# Patient Record
Sex: FEMALE | Race: WHITE | URBAN - METROPOLITAN AREA
[De-identification: names, ages, dates, MRNs, and addresses within clinical notes are randomized per-mention and may not be internally consistent; named-entity substitution may affect disease eponyms.]

---

## 2018-05-02 ENCOUNTER — HOSPITAL ENCOUNTER (EMERGENCY)
Facility: CLINIC | Age: 33
Discharge: HOME | End: 2018-05-02
Attending: PHYSICIAN ASSISTANT | Admitting: PHYSICIAN ASSISTANT

## 2018-05-02 ENCOUNTER — APPOINTMENT (OUTPATIENT)
Dept: MRI IMAGING | Facility: CLINIC | Age: 33
End: 2018-05-02
Attending: PHYSICIAN ASSISTANT

## 2018-05-02 VITALS
DIASTOLIC BLOOD PRESSURE: 74 MMHG | WEIGHT: 145 LBS | TEMPERATURE: 98.6 F | HEART RATE: 93 BPM | RESPIRATION RATE: 20 BRPM | OXYGEN SATURATION: 94 % | SYSTOLIC BLOOD PRESSURE: 124 MMHG

## 2018-05-02 DIAGNOSIS — R20.2 PARESTHESIA: ICD-10-CM

## 2018-05-02 LAB
ALBUMIN SERPL-MCNC: 4.2 G/DL (ref 3.4–5)
ALP SERPL-CCNC: 42 U/L (ref 40–150)
ALT SERPL W P-5'-P-CCNC: 57 U/L (ref 0–50)
ANION GAP SERPL CALCULATED.3IONS-SCNC: 6 MMOL/L (ref 3–14)
AST SERPL W P-5'-P-CCNC: 45 U/L (ref 0–45)
B-HCG FREE SERPL-ACNC: <5 IU/L
BASOPHILS # BLD AUTO: 0.1 10E9/L (ref 0–0.2)
BASOPHILS NFR BLD AUTO: 1 %
BILIRUB DIRECT SERPL-MCNC: 0.1 MG/DL (ref 0–0.2)
BILIRUB SERPL-MCNC: 1.4 MG/DL (ref 0.2–1.3)
BUN SERPL-MCNC: 6 MG/DL (ref 7–30)
CALCIUM SERPL-MCNC: 9.1 MG/DL (ref 8.5–10.1)
CHLORIDE SERPL-SCNC: 106 MMOL/L (ref 94–109)
CO2 SERPL-SCNC: 27 MMOL/L (ref 20–32)
CREAT SERPL-MCNC: 0.64 MG/DL (ref 0.52–1.04)
DIFFERENTIAL METHOD BLD: NORMAL
EOSINOPHIL # BLD AUTO: 0.2 10E9/L (ref 0–0.7)
EOSINOPHIL NFR BLD AUTO: 2.8 %
ERYTHROCYTE [DISTWIDTH] IN BLOOD BY AUTOMATED COUNT: 14 % (ref 10–15)
ETHANOL SERPL-MCNC: <0.01 G/DL
GFR SERPL CREATININE-BSD FRML MDRD: >90 ML/MIN/1.7M2
GLUCOSE BLDC GLUCOMTR-MCNC: 143 MG/DL (ref 70–99)
GLUCOSE SERPL-MCNC: 134 MG/DL (ref 70–99)
HCT VFR BLD AUTO: 37.9 % (ref 35–47)
HGB BLD-MCNC: 12.5 G/DL (ref 11.7–15.7)
IMM GRANULOCYTES # BLD: 0 10E9/L (ref 0–0.4)
IMM GRANULOCYTES NFR BLD: 0.3 %
INTERPRETATION ECG - MUSE: NORMAL
LYMPHOCYTES # BLD AUTO: 1.5 10E9/L (ref 0.8–5.3)
LYMPHOCYTES NFR BLD AUTO: 21.7 %
MAGNESIUM SERPL-MCNC: 2.4 MG/DL (ref 1.6–2.3)
MCH RBC QN AUTO: 29.8 PG (ref 26.5–33)
MCHC RBC AUTO-ENTMCNC: 33 G/DL (ref 31.5–36.5)
MCV RBC AUTO: 91 FL (ref 78–100)
MONOCYTES # BLD AUTO: 0.5 10E9/L (ref 0–1.3)
MONOCYTES NFR BLD AUTO: 7.8 %
NEUTROPHILS # BLD AUTO: 4.6 10E9/L (ref 1.6–8.3)
NEUTROPHILS NFR BLD AUTO: 66.4 %
NRBC # BLD AUTO: 0 10*3/UL
NRBC BLD AUTO-RTO: 0 /100
PLATELET # BLD AUTO: 326 10E9/L (ref 150–450)
POTASSIUM SERPL-SCNC: 3.8 MMOL/L (ref 3.4–5.3)
PROT SERPL-MCNC: 8.6 G/DL (ref 6.8–8.8)
RBC # BLD AUTO: 4.19 10E12/L (ref 3.8–5.2)
SODIUM SERPL-SCNC: 139 MMOL/L (ref 133–144)
WBC # BLD AUTO: 6.9 10E9/L (ref 4–11)

## 2018-05-02 PROCEDURE — A9585 GADOBUTROL INJECTION: HCPCS | Performed by: RADIOLOGY

## 2018-05-02 PROCEDURE — 96361 HYDRATE IV INFUSION ADD-ON: CPT

## 2018-05-02 PROCEDURE — 96375 TX/PRO/DX INJ NEW DRUG ADDON: CPT

## 2018-05-02 PROCEDURE — 25000128 H RX IP 250 OP 636: Performed by: PHYSICIAN ASSISTANT

## 2018-05-02 PROCEDURE — 80076 HEPATIC FUNCTION PANEL: CPT | Performed by: PHYSICIAN ASSISTANT

## 2018-05-02 PROCEDURE — 85025 COMPLETE CBC W/AUTO DIFF WBC: CPT | Performed by: PHYSICIAN ASSISTANT

## 2018-05-02 PROCEDURE — 80320 DRUG SCREEN QUANTALCOHOLS: CPT | Performed by: PHYSICIAN ASSISTANT

## 2018-05-02 PROCEDURE — 83735 ASSAY OF MAGNESIUM: CPT | Performed by: PHYSICIAN ASSISTANT

## 2018-05-02 PROCEDURE — 84702 CHORIONIC GONADOTROPIN TEST: CPT

## 2018-05-02 PROCEDURE — 93005 ELECTROCARDIOGRAM TRACING: CPT

## 2018-05-02 PROCEDURE — 99285 EMERGENCY DEPT VISIT HI MDM: CPT | Mod: 25

## 2018-05-02 PROCEDURE — 00000146 ZZHCL STATISTIC GLUCOSE BY METER IP

## 2018-05-02 PROCEDURE — 96374 THER/PROPH/DIAG INJ IV PUSH: CPT | Mod: 59

## 2018-05-02 PROCEDURE — 80048 BASIC METABOLIC PNL TOTAL CA: CPT | Performed by: PHYSICIAN ASSISTANT

## 2018-05-02 PROCEDURE — 70553 MRI BRAIN STEM W/O & W/DYE: CPT

## 2018-05-02 PROCEDURE — 25000128 H RX IP 250 OP 636: Performed by: RADIOLOGY

## 2018-05-02 RX ORDER — DEXAMETHASONE SODIUM PHOSPHATE 10 MG/ML
10 INJECTION, SOLUTION INTRAMUSCULAR; INTRAVENOUS EVERY 6 HOURS
Status: DISCONTINUED | OUTPATIENT
Start: 2018-05-02 | End: 2018-05-02 | Stop reason: HOSPADM

## 2018-05-02 RX ORDER — DIPHENHYDRAMINE HYDROCHLORIDE 50 MG/ML
25 INJECTION INTRAMUSCULAR; INTRAVENOUS ONCE
Status: COMPLETED | OUTPATIENT
Start: 2018-05-02 | End: 2018-05-02

## 2018-05-02 RX ORDER — GADOBUTROL 604.72 MG/ML
7.5 INJECTION INTRAVENOUS ONCE
Status: COMPLETED | OUTPATIENT
Start: 2018-05-02 | End: 2018-05-02

## 2018-05-02 RX ORDER — ONDANSETRON 2 MG/ML
4 INJECTION INTRAMUSCULAR; INTRAVENOUS EVERY 30 MIN PRN
Status: DISCONTINUED | OUTPATIENT
Start: 2018-05-02 | End: 2018-05-02 | Stop reason: HOSPADM

## 2018-05-02 RX ORDER — KETOROLAC TROMETHAMINE 30 MG/ML
15 INJECTION, SOLUTION INTRAMUSCULAR; INTRAVENOUS ONCE
Status: COMPLETED | OUTPATIENT
Start: 2018-05-02 | End: 2018-05-02

## 2018-05-02 RX ORDER — METOCLOPRAMIDE HYDROCHLORIDE 5 MG/ML
10 INJECTION INTRAMUSCULAR; INTRAVENOUS ONCE
Status: COMPLETED | OUTPATIENT
Start: 2018-05-02 | End: 2018-05-02

## 2018-05-02 RX ADMIN — ONDANSETRON 4 MG: 2 INJECTION INTRAMUSCULAR; INTRAVENOUS at 12:52

## 2018-05-02 RX ADMIN — KETOROLAC TROMETHAMINE 15 MG: 30 INJECTION, SOLUTION INTRAMUSCULAR at 13:12

## 2018-05-02 RX ADMIN — METOCLOPRAMIDE 10 MG: 5 INJECTION, SOLUTION INTRAMUSCULAR; INTRAVENOUS at 13:12

## 2018-05-02 RX ADMIN — SODIUM CHLORIDE 500 ML: 9 INJECTION, SOLUTION INTRAVENOUS at 12:32

## 2018-05-02 RX ADMIN — GADOBUTROL 6 ML: 604.72 INJECTION INTRAVENOUS at 13:40

## 2018-05-02 RX ADMIN — DEXAMETHASONE SODIUM PHOSPHATE 10 MG: 10 INJECTION, SOLUTION INTRAMUSCULAR; INTRAVENOUS at 13:12

## 2018-05-02 RX ADMIN — SODIUM CHLORIDE 1000 ML: 9 INJECTION, SOLUTION INTRAVENOUS at 13:13

## 2018-05-02 RX ADMIN — DIPHENHYDRAMINE HYDROCHLORIDE 25 MG: 50 INJECTION, SOLUTION INTRAMUSCULAR; INTRAVENOUS at 13:11

## 2018-05-02 ASSESSMENT — ENCOUNTER SYMPTOMS
SPEECH DIFFICULTY: 0
WEAKNESS: 0
TREMORS: 1
NAUSEA: 1
NUMBNESS: 1
ABDOMINAL PAIN: 0
FACIAL ASYMMETRY: 0

## 2018-05-02 NOTE — ED AVS SNAPSHOT
" [facility] Emergency Department    [address]    [facility] [address]    Phone:  [phone]    Fax:  [phone]                                       Ramonita Gordon   MRN: [id number]    Department:  [facility] Emergency Department   Date of Visit:  5/2/2018           Patient Information     Date Of Birth          1985        Your diagnoses for this visit were:     Paresthesia        You were seen by Sabine Person PA-C.      Follow-up Information     Follow up with Neurology, [facility] In 2 days.    Why:  recheck    Contact information:    [address]  [address]  [facility] [address]  [phone]          Follow up with [facility] Emergency Department.    Specialty:  EMERGENCY MEDICINE    Why:  If symptoms worsen    Contact information:    [address]  [facility] [address]  [phone]        Discharge Instructions         Paraesthesias  Paraesthesia is a burning or prickling sensation that is sometimes felt in the hands, arms, legs or feet. It can also occur in other parts of the body. It can also feel like tingling or numbness, skin crawling, or itching. The feeling is not comfortable, but it is not painful. (The \"pins and needles\" feeling that happens when a foot or hand \"falls asleep\" is a temporary paraesthesia.)  Paraesthesias that last or come and go may be caused by medical issues that need to be treated. These include stroke, a bulging disk pressing on a nerve, a trapped nerve, vitamin deficiencies, or even certain medicines.  Tests are often done. These tests may include blood tests, X-ray, CT (computerized tomography) scan, or a muscle test (electromyography). Depending on the cause, treatment may include physical therapy.  Home care    Tell the healthcare provider about all medicines you take. This includes prescription and over-the-counter medicines, vitamins, and herbs. Ask if any of the " medicines may be causing your problems. Do not make any changes to prescription medicines without talking to your healthcare provider first.    You may be prescribed medicines to help relieve the tingling feeling or for pain. Take all medicines as directed.    A numb hand or foot may be more prone to injury. To help protect it:  ? Always use oven mitts.  ? Test water with an unaffected hand or foot.  ? Use caution when trimming nails. File sharp areas.  ? Wear shoes that fit well to avoid pressure points, blisters, and ulcers.  ? Inspect your hands and feet carefully (including the soles of your feet and between your toes) at least once a week. If you see red areas, sores, or other problems, tell your healthcare provider.  Follow-up care  Follow up with your doctor or as advised by our staff. You may need further testing or evaluation.  When to seek medical advice  Call your healthcare provider right away if any of the following occur:    Numbness or weakness of the face, one arm, or one leg    Slurred speech, confusion, trouble speaking, walking, or seeing    Severe headache, fainting spell, dizziness, or seizure    Chest, arm, neck, or upper back pain    Loss of bladder or bowel control    Open wound with redness, swelling, or pus  Date Last Reviewed: 9/25/2015 2000-2017 [employer]. [address] [place] [address]. All rights reserved. This information is not intended as a substitute for professional medical care. Always follow your healthcare professional's instructions.          24 Hour Appointment Hotline       To make an appointment at any [facility], call [phone] ([phone]). If you don't have a family doctor or clinic, we will help you find one. [facility] are conveniently located to serve the needs of you and your family.             Review of your medicines      Notice     You have not been prescribed any medications.            Procedures and tests  performed during your visit     Alcohol ethyl    Basic metabolic panel    CBC with platelets differential    EKG 12-lead, tracing only    Glucose by meter    Glucose monitor nursing POCT    Hepatic panel    ISTAT HCG Quantitative Pregnancy POCT    MR Brain w/o & w Contrast    Magnesium    Pulse oximetry nursing      Orders Needing Specimen Collection     None      Pending Results     No orders found from 4/30/2018 to 5/3/2018.            Pending Culture Results     No orders found from 4/30/2018 to 5/3/2018.            Pending Results Instructions     If you had any lab results that were not finalized at the time of your Discharge, you can call the ED Lab Result RN at [phone]. You will be contacted by this team for any positive Lab results or changes in treatment. The nurses are available 7 days a week from 10A to 6:30P.  You can leave a message 24 hours per day and they will return your call.        Test Results From Your Hospital Stay        5/2/2018 12:51 PM      Component Results     Component Value Ref Range & Units Status    WBC 6.9 4.0 - 11.0 10e9/L Final    RBC Count 4.19 3.8 - 5.2 10e12/L Final    Hemoglobin 12.5 11.7 - 15.7 g/dL Final    Hematocrit 37.9 35.0 - 47.0 % Final    MCV 91 78 - 100 fl Final    MCH 29.8 26.5 - 33.0 pg Final    MCHC 33.0 31.5 - 36.5 g/dL Final    RDW 14.0 10.0 - 15.0 % Final    Platelet Count 326 150 - 450 10e9/L Final    Diff Method Automated Method  Final    % Neutrophils 66.4 % Final    % Lymphocytes 21.7 % Final    % Monocytes 7.8 % Final    % Eosinophils 2.8 % Final    % Basophils 1.0 % Final    % Immature Granulocytes 0.3 % Final    Nucleated RBCs 0 0 /100 Final    Absolute Neutrophil 4.6 1.6 - 8.3 10e9/L Final    Absolute Lymphocytes 1.5 0.8 - 5.3 10e9/L Final    Absolute Monocytes 0.5 0.0 - 1.3 10e9/L Final    Absolute Eosinophils 0.2 0.0 - 0.7 10e9/L Final    Absolute Basophils 0.1 0.0 - 0.2 10e9/L Final    Abs Immature Granulocytes 0.0 0 - 0.4 10e9/L Final     Absolute Nucleated RBC 0.0  Final         5/2/2018  1:05 PM      Component Results     Component Value Ref Range & Units Status    Sodium 139 133 - 144 mmol/L Final    Potassium 3.8 3.4 - 5.3 mmol/L Final    Chloride 106 94 - 109 mmol/L Final    Carbon Dioxide 27 20 - 32 mmol/L Final    Anion Gap 6 3 - 14 mmol/L Final    Glucose 134 (H) 70 - 99 mg/dL Final    Urea Nitrogen 6 (L) 7 - 30 mg/dL Final    Creatinine 0.64 0.52 - 1.04 mg/dL Final    GFR Estimate >90 >60 mL/min/1.7m2 Final    Non  GFR Calc    GFR Estimate If Black >90 >60 mL/min/1.7m2 Final    African American GFR Calc    Calcium 9.1 8.5 - 10.1 mg/dL Final         5/2/2018 12:40 PM      Component Results     Component Value Ref Range & Units Status    HCG Quantitative Serum <5.0 <5.0 IU/L Final         5/2/2018 12:59 PM      Component Results     Component Value Ref Range & Units Status    Glucose 143 (H) 70 - 99 mg/dL Final         5/2/2018  4:22 PM      Narrative     MRI OF THE BRAIN WITHOUT AND WITH CONTRAST 5/2/2018 1:49 PM     COMPARISON: None.    HISTORY: Left-sided facial and lateral arm and leg numbness.      TECHNIQUE: Multi-sequence, multi-planar MRI images of the brain were  acquired before and after the administration of IV gadolinium (6 mL  Gadavist).    FINDINGS: The ventricles and basal cisterns are normal in  configuration. There is no midline shift. There are no extra-axial  fluid collections. Gray-white differentiation is well maintained.  There is no evidence for stroke or acute intracranial hemorrhage.  There is no abnormal contrast enhancement in the brain or its  coverings.    There is no sinusitis or mastoiditis.        Impression     IMPRESSION: Normal brain MRI.    BENJIE MELENDEZ MD         5/2/2018  3:02 PM      Component Results     Component Value Ref Range & Units Status    Bilirubin Direct 0.1 0.0 - 0.2 mg/dL Final    Bilirubin Total 1.4 (H) 0.2 - 1.3 mg/dL Final    Albumin 4.2 3.4 - 5.0 g/dL Final    Protein  Total 8.6 6.8 - 8.8 g/dL Final    Alkaline Phosphatase 42 40 - 150 U/L Final    ALT 57 (H) 0 - 50 U/L Final    AST 45 0 - 45 U/L Final         5/2/2018  3:02 PM      Component Results     Component Value Ref Range & Units Status    Ethanol g/dL <0.01 <0.01 g/dL Final         5/2/2018  2:33 PM      Component Results     Component Value Ref Range & Units Status    Magnesium 2.4 (H) 1.6 - 2.3 mg/dL Final                Clinical Quality Measure: Blood Pressure Screening     Your blood pressure was checked while you were in the emergency department today. The last reading we obtained was  BP: (!) 130/94 . Please read the guidelines below about what these numbers mean and what you should do about them.  If your systolic blood pressure (the top number) is less than 120 and your diastolic blood pressure (the bottom number) is less than 80, then your blood pressure is normal. There is nothing more that you need to do about it.  If your systolic blood pressure (the top number) is 120-139 or your diastolic blood pressure (the bottom number) is 80-89, your blood pressure may be higher than it should be. You should have your blood pressure rechecked within a year by a primary care provider.  If your systolic blood pressure (the top number) is 140 or greater or your diastolic blood pressure (the bottom number) is 90 or greater, you may have high blood pressure. High blood pressure is treatable, but if left untreated over time it can put you at risk for heart attack, stroke, or kidney failure. You should have your blood pressure rechecked by a primary care provider within the next 4 weeks.  If your provider in the emergency department today gave you specific instructions to follow-up with your doctor or provider even sooner than that, you should follow that instruction and not wait for up to 4 weeks for your follow-up visit.        Thank you for choosing [facility]       Thank you for choosing [facility] for your care. Our goal is  "always to provide you with excellent care. Hearing back from our patients is one way we can continue to improve our services. Please take a few minutes to complete the written survey that you may receive in the mail after you visit with us. Thank you!        [employer] Information     [employer] lets you send messages to your doctor, view your test results, renew your prescriptions, schedule appointments and more. To sign up, go to [email or web address] . Click on \"Log in\" on the left side of the screen, which will take you to the Welcome page. Then click on \"Sign up Now\" on the right side of the page.     You will be asked to enter the access code listed below, as well as some personal information. Please follow the directions to create your username and password.     Your access code is: [email or web address]  Expires: 2018  4:36 PM     Your access code will  in 90 days. If you need help or a new code, please call your [place] clinic or [phone].        Care EveryWhere ID     This is your Care EveryWhere ID. This could be used by other organizations to access your [place] medical records  [id number]        Equal Access to Services     CHRIS DE LEON : Hadii je Montoya, wajanet espinosa, qaisaías palmer, suri addison. So [facility] [phone].    ATENCIÓN: Si habla español, tiene a love disposición servicios gratuitos de asistencia lingüística. Jean Paul al [phone].    We comply with applicable federal civil rights laws and [place] laws. We do not discriminate on the basis of race, color, national origin, age, disability, sex, sexual orientation, or gender identity.            After Visit Summary       This is your record. Keep this with you and show to your community pharmacist(s) and doctor(s) at your next visit.                  "

## 2018-05-02 NOTE — ED TRIAGE NOTES
90 minutes of progressing numbness on left side of body from top of head down to left knee.  Denies weakness.  Negative droop.  Speech clear.      ABCs intact.  Alert and orientedx 3.

## 2018-05-02 NOTE — ED PROVIDER NOTES
Emergency Department Attending Supervision Note  5/2/2018  12:59 PM      I evaluated this patient in conjunction with Sabine AZEVEDO      Briefly, the patient presented with gradual onset of left sided numbness that started approximately 1030.  This started on the left side of her face is moved to her left upper arm and left upper thigh.  She has no other deficits.  There is no weakness.  She has had some nausea no vomiting.  Due to the continuation of the symptoms she presented for evaluation.  She does have a distant history of migraines but has not had one in 15 years.  She describes this as different from when she had migraines back then.  She did not have any associated neurologic symptoms.  She does seem to be mildly anxious and tremulous.  She has had no recent fevers, vomiting, diarrhea, abdominal pain, shortness of breath, or chest pain.    After twin sisters arrival.  The nurse was pulled aside and told that the patient drinks up to 15 drinks a day.      On my exam:  General:  Alert, No obvious discomfort, well kept  HEENT:  Normal Voice, PERRL, EOM normal, oropharynx is normal, Uvula is midline, Conjunctivae and sclerae are normal, No lymphadenopathy    Neck: Normal range of motion, No meningismus. There is no midline cervical spine pain/tenderness. No mass is detected  CV:  Regular rate and underlying rhythm, Normal Peripheral pulses. No murmurs, rubs or clicks  Resp: Lungs are clear, No tachypnea, Non-labored breathing, No wheezing, crackles, or rales   GI:  Abdomen is soft, there is no rigidity, No distension, No tympani, No rebound tenderness, Non-surgical without peritoneal features    MS:  No major joint effusions, No asymmetric leg swelling. No calf tenderness  Skin:  No rash or acute skin lesions noted, Warm, Dry  Neuro:    National Institutes of Health Stroke Scale  Exam Interval: Baseline   Score    Level of consciousness: (0)   Alert, keenly responsive    LOC questions: (0)   Answers both  questions correctly    LOC commands: (0)   Performs both tasks correctly    Best gaze: (0)   Normal    Visual: (0)   No visual loss    Facial palsy: (0)   Normal symmetrical movements    Motor arm (left): (0)   No drift    Motor arm (right): (0)   No drift    Motor leg (left): (0)   No drift    Motor leg (right): (0)   No drift    Limb ataxia: (0)   Absent    Sensory: (1)   Mild to moderate sensory loss (ability to distinguish sharp dull sensation left side of face and left upper arm)    Best language: (0)   Normal- no aphasia    Dysarthria: (0)   Normal    Extinction and inattention: (0)   No abnormality        Total Score:  1   Psych:  Awake. Alert.  Mildly anxious.  Appropriate interactions. Good eye contact    Recent Results (from the past 24 hour(s))   MR Brain w/o & w Contrast    Narrative    MRI OF THE BRAIN WITHOUT AND WITH CONTRAST 5/2/2018 1:49 PM     COMPARISON: None.    HISTORY: Left-sided facial and lateral arm and leg numbness.      TECHNIQUE: Multi-sequence, multi-planar MRI images of the brain were  acquired before and after the administration of IV gadolinium (6 mL  Gadavist).    FINDINGS: The ventricles and basal cisterns are normal in  configuration. There is no midline shift. There are no extra-axial  fluid collections. Gray-white differentiation is well maintained.  There is no evidence for stroke or acute intracranial hemorrhage.  There is no abnormal contrast enhancement in the brain or its  coverings.    There is no sinusitis or mastoiditis.      Impression    IMPRESSION: Normal brain MRI.     Recent Results (from the past 8 hour(s))   CBC with platelets differential    Collection Time: 05/02/18 12:21 PM   Result Value Ref Range    WBC 6.9 4.0 - 11.0 10e9/L    RBC Count 4.19 3.8 - 5.2 10e12/L    Hemoglobin 12.5 11.7 - 15.7 g/dL    Hematocrit 37.9 35.0 - 47.0 %    MCV 91 78 - 100 fl    MCH 29.8 26.5 - 33.0 pg    MCHC 33.0 31.5 - 36.5 g/dL    RDW 14.0 10.0 - 15.0 %    Platelet Count 326 150 -  450 10e9/L    Diff Method Automated Method     % Neutrophils 66.4 %    % Lymphocytes 21.7 %    % Monocytes 7.8 %    % Eosinophils 2.8 %    % Basophils 1.0 %    % Immature Granulocytes 0.3 %    Nucleated RBCs 0 0 /100    Absolute Neutrophil 4.6 1.6 - 8.3 10e9/L    Absolute Lymphocytes 1.5 0.8 - 5.3 10e9/L    Absolute Monocytes 0.5 0.0 - 1.3 10e9/L    Absolute Eosinophils 0.2 0.0 - 0.7 10e9/L    Absolute Basophils 0.1 0.0 - 0.2 10e9/L    Abs Immature Granulocytes 0.0 0 - 0.4 10e9/L    Absolute Nucleated RBC 0.0    Basic metabolic panel    Collection Time: 05/02/18 12:21 PM   Result Value Ref Range    Sodium 139 133 - 144 mmol/L    Potassium 3.8 3.4 - 5.3 mmol/L    Chloride 106 94 - 109 mmol/L    Carbon Dioxide 27 20 - 32 mmol/L    Anion Gap 6 3 - 14 mmol/L    Glucose 134 (H) 70 - 99 mg/dL    Urea Nitrogen 6 (L) 7 - 30 mg/dL    Creatinine 0.64 0.52 - 1.04 mg/dL    GFR Estimate >90 >60 mL/min/1.7m2    GFR Estimate If Black >90 >60 mL/min/1.7m2    Calcium 9.1 8.5 - 10.1 mg/dL   Hepatic panel    Collection Time: 05/02/18 12:21 PM   Result Value Ref Range    Bilirubin Direct 0.1 0.0 - 0.2 mg/dL    Bilirubin Total 1.4 (H) 0.2 - 1.3 mg/dL    Albumin 4.2 3.4 - 5.0 g/dL    Protein Total 8.6 6.8 - 8.8 g/dL    Alkaline Phosphatase 42 40 - 150 U/L    ALT 57 (H) 0 - 50 U/L    AST 45 0 - 45 U/L   Alcohol ethyl    Collection Time: 05/02/18 12:21 PM   Result Value Ref Range    Ethanol g/dL <0.01 <0.01 g/dL   Magnesium    Collection Time: 05/02/18 12:21 PM   Result Value Ref Range    Magnesium 2.4 (H) 1.6 - 2.3 mg/dL   EKG 12-lead, tracing only    Collection Time: 05/02/18 12:24 PM   Result Value Ref Range    Interpretation ECG Click View Image link to view waveform and result    ISTAT HCG Quantitative Pregnancy POCT    Collection Time: 05/02/18 12:26 PM   Result Value Ref Range    HCG Quantitative Serum <5.0 <5.0 IU/L   Glucose by meter    Collection Time: 05/02/18 12:42 PM   Result Value Ref Range    Glucose 143 (H) 70 - 99 mg/dL      Patient with low NIH scale and negative MRI makes CVA unlikely.  Her laboratory studies show are noncontributory.  She does have an elevated glucose today.  Due to patient's sisters comment about drinking I did check an alcohol level this was negative.  I doubt withdrawal symptoms.  There is been no sign of seizure activity.  Given the lack of findings as above, resolution of symptoms from the above treatment, and patient's history of migraines.  This most likely represents a migraine variant.    Diagnosis    ICD-10-CM    1. Paresthesia R20.2          Remigio Yoo 5/2/2018 12:59 PM     Remigio Ruiz, APRN CNP  05/02/18 1622

## 2018-05-02 NOTE — ED AVS SNAPSHOT
[facility] Emergency Department    [address]    [facility] [address]    Phone:  [phone]    Fax:  [phone]                                       Ramonita Gordon   MRN: [id number]    Department:  [facility] Emergency Department   Date of Visit:  5/2/2018           After Visit Summary Signature Page     I have received my discharge instructions, and my questions have been answered. I have discussed any challenges I see with this plan with the nurse or doctor.    ..........................................................................................................................................  Patient/Patient Representative Signature      ..........................................................................................................................................  Patient Representative Print Name and Relationship to Patient    ..................................................               ................................................  Date                                            Time    ..........................................................................................................................................  Reviewed by Signature/Title    ...................................................              ..............................................  Date                                                            Time

## 2018-05-02 NOTE — ED PROVIDER NOTES
History     Chief Complaint:  Numbness    HPI   Ramonita Gordon is a 32 year old female with a history of migraines who presents to the emergency department today for evaluation of numbness. The patient reports an onset of left sided facial numbness starting at 1030.  Since that time, this numbness has progressed on her lateral left arm and her lateral left leg.  This is associated with nausea, but she denies vomiting. She was concerned about this numbness prompting her visit to the emergency department. At arrival, the patient also notes tremors on both sides. No previous similar episodes. No associated pain. She denies facial droop, speech difficulty, abdominal pain, fever, recent illness, recent travel, chest pain, shortness of breath, weakness, and birth control use. Of note, the patient is currently on her period.     Allergies:  Codeine  Morphine    Medications:    The patient is currently on no regular medications.    Past Medical History:    Migraines    Past Surgical History:    History reviewed. No pertinent past surgical history.    Family History:    History reviewed. No pertinent family history.     Social History:  The patient was accompanied to the ED by family.  Smoking Status: current Every Day Smoker  Smokeless Tobacco: Never  Alcohol Use: No  Marital Status:       Review of Systems   Gastrointestinal: Positive for nausea. Negative for abdominal pain.   Neurological: Positive for tremors and numbness. Negative for facial asymmetry, speech difficulty and weakness.   All other systems reviewed and are negative.    Physical Exam     Patient Vitals for the past 24 hrs:   BP Temp Temp src Pulse Heart Rate Resp SpO2 Weight   05/02/18 1710 124/74 - - - 94 20 94 % -   05/02/18 1700 - - - - 89 11 96 % -   05/02/18 1606 - - - - 75 12 98 % -   05/02/18 1448 - - - - 68 19 98 % -   05/02/18 1400 - - - - 80 23 97 % -   05/02/18 1230 (!) 130/94 - - - 83 11 - -   05/02/18 1208 (!) 141/105 98.6  F  (37  C) Temporal 93 - 14 100 % 65.8 kg (145 lb)         Physical Exam  General: Resting comfortably.  Alert and oriented. Anxious appearing.   Head:  The scalp, face, and head appear normal  Eyes:  The pupils are equal, round, and reactive to light     Extraocular muscles are intact    Conjunctivae and sclerae are normal    ENT:    The oropharynx is normal    Uvula is in the midline    Moist mucous membranes   Neck:  Normal range of motion    There is no rigidity noted    No lymphadenopathy  CV:  Regular rate and rhythm     Normal S1/S2    No pathological murmur detected  Resp:  Lungs are clear to auscultation    Non-labored    No rales or wheezing  GI:  Abdomen is soft, non-distended    No abdominal tenderness   MS:  Normal muscular tone    Preserved strength in all four extremities  Skin:  No rash or acute skin lesions noted  Neuro: Speech is normal and fluent. Cranial nerves II-XII intact aside from decreased sensation noted to the V2 and V3 distribution.  strength is equal and strong. Strength intact with extension and flexion at the elbow of bilateral upper extremities.  Preserved strength with hip flexion/extension, knee flexion/extension, dorsi/plantar flexion in bilateral lower extremities. No pronator drift. No facial droop. Decreased sensation to the V2 and V3 distribution of the left side of face, the lateral aspect of her left arm down to her elbow and the lateral aspect of her leg down to her knee. Otherwise sensation is within normal limits.  Finger-nose-finger and heel shin intact.    Emergency Department Course   ECG:  Indication: numbness  Completed at 1224.  Read at 1256.   Normal sinus rhythm  Normal ECG   Rate 96 bpm. CT interval 116. QRS duration 86. QT/QTc 362/457. P-R-T axes 23  65  35.    Imaging:  Radiology findings were communicated with the patient and family who voiced understanding of the findings.  MR Brain w/o and w Contrast  IMPRESSION: Normal brain MRI.  Report per radiology      Laboratory:  Laboratory findings were communicated with the patient and family who voiced understanding of the findings.  CBC: WNL. (WBC 6.9, HGB 12.5, )   BMP: glucose 134 (H), BUN 6 (L) o/w WNL (Creatinine 0.64)  HCG quantitative pregnancy POCT: <5.0  Glucose by meter: 143 (H)  Hepatic panel: bilirubin 1.4 (H) o/w WNL  Alcohol ethyl: <0.01  Magnesium: 2.4 (H)    Interventions:  1252 Zofran 4mg IV  1311 Benadryl 25 mg IV  1312 Decadron 10 mg IV  1312 Toradol 15 mg IV  1312 Reglan 10 mg IV  1313 NS Bolus 1,000mL IV    Emergency Department Course:  Nursing notes and vitals reviewed.  IV was inserted and blood was drawn for laboratory testing, results above.  The patient was sent for a MR Brain w/o and w Contrast while in the emergency department, results above.   1214: I performed an exam of the patient as documented above.   1424: Patient rechecked and updated.   1613: Patient rechecked and updated.   1621: Patient rechecked and updated.   Findings and plan explained to the Patient and family. Patient discharged home with instructions regarding supportive care, medications, and reasons to return. The importance of close follow-up was reviewed.  I personally reviewed the laboratory, imaging, and EKG results with the Patient and family and answered all related questions prior to discharge.    Impression & Plan    Medical Decision Making:  Ramonita Gordon is a 32 year old female who presents for evaluation of left-sided facial, arm, and leg numbness.  She presents hypertensive, but otherwise vitally stable.  She has no associated weakness, headache, vomiting, or any other acute symptoms.  A large differential was considered including, but not limited to, atypical migraine, TIA, stroke, neck radiculopathy, SAH, intercranial hemmorrage, demyelinating disease, nerve compression, among others.  CBC and BMP are overall unremarkable.  Hepatic panel demonstrates mild bilirubin and ALT elevation.  Magnesium is  slightly elevated at 2.4.  HCG is negative.  Thankfully, MRI is negative for any abnormalities.  Patient was given IV fluids, Reglan, Toradol, Decadron, and Benadryl with significant improvement in symptoms. Upon reexamination the patient's symptoms have completely resolved and has no other associated symptoms.  Otherwise I do not know the exact cause of her symptoms at this time, I doubt a serious/life-threatening etiology. I think this patient is safe to be discharged home.  She was advised to take 81 mg aspirin and follow-up with neurology in 1-2 days.  She is also encouraged to follow-up with her primary care doctor as soon as possible.  She is asked to return immediately for any weakness, return of numbness, severe headache, vomiting or any other concerns.  All questions answered prior to discharge.  The patient understands and agrees to this plan.    Diagnosis:    ICD-10-CM    1. Paresthesia R20.2        Disposition:  discharged to home    Scribe Disclosure:  Brian CAMARA, am serving as a scribe at 12:12 PM on 5/2/2018 to document services personally performed by Sabine Person PA based on my observations and the provider's statements to me.     5/2/2018   [facility] EMERGENCY DEPARTMENT       Sabine Person PA-C  05/02/18 1931

## 2018-05-02 NOTE — DISCHARGE INSTRUCTIONS
"  Paraesthesias  Paraesthesia is a burning or prickling sensation that is sometimes felt in the hands, arms, legs or feet. It can also occur in other parts of the body. It can also feel like tingling or numbness, skin crawling, or itching. The feeling is not comfortable, but it is not painful. (The \"pins and needles\" feeling that happens when a foot or hand \"falls asleep\" is a temporary paraesthesia.)  Paraesthesias that last or come and go may be caused by medical issues that need to be treated. These include stroke, a bulging disk pressing on a nerve, a trapped nerve, vitamin deficiencies, or even certain medicines.  Tests are often done. These tests may include blood tests, X-ray, CT (computerized tomography) scan, or a muscle test (electromyography). Depending on the cause, treatment may include physical therapy.  Home care    Tell the healthcare provider about all medicines you take. This includes prescription and over-the-counter medicines, vitamins, and herbs. Ask if any of the medicines may be causing your problems. Do not make any changes to prescription medicines without talking to your healthcare provider first.    You may be prescribed medicines to help relieve the tingling feeling or for pain. Take all medicines as directed.    A numb hand or foot may be more prone to injury. To help protect it:  ? Always use oven mitts.  ? Test water with an unaffected hand or foot.  ? Use caution when trimming nails. File sharp areas.  ? Wear shoes that fit well to avoid pressure points, blisters, and ulcers.  ? Inspect your hands and feet carefully (including the soles of your feet and between your toes) at least once a week. If you see red areas, sores, or other problems, tell your healthcare provider.  Follow-up care  Follow up with your doctor or as advised by our staff. You may need further testing or evaluation.  When to seek medical advice  Call your healthcare provider right away if any of the following " occur:    Numbness or weakness of the face, one arm, or one leg    Slurred speech, confusion, trouble speaking, walking, or seeing    Severe headache, fainting spell, dizziness, or seizure    Chest, arm, neck, or upper back pain    Loss of bladder or bowel control    Open wound with redness, swelling, or pus  Date Last Reviewed: 9/25/2015 2000-2017 [employer]. [address] [place] [address]. All rights reserved. This information is not intended as a substitute for professional medical care. Always follow your healthcare professional's instructions.